# Patient Record
Sex: FEMALE | Race: WHITE | NOT HISPANIC OR LATINO
[De-identification: names, ages, dates, MRNs, and addresses within clinical notes are randomized per-mention and may not be internally consistent; named-entity substitution may affect disease eponyms.]

---

## 2019-09-25 PROBLEM — Z00.00 ENCOUNTER FOR PREVENTIVE HEALTH EXAMINATION: Status: ACTIVE | Noted: 2019-09-25

## 2019-10-02 ENCOUNTER — APPOINTMENT (OUTPATIENT)
Dept: COLORECTAL SURGERY | Facility: CLINIC | Age: 29
End: 2019-10-02
Payer: COMMERCIAL

## 2019-10-02 VITALS
TEMPERATURE: 98.2 F | BODY MASS INDEX: 21.67 KG/M2 | HEIGHT: 68 IN | DIASTOLIC BLOOD PRESSURE: 65 MMHG | WEIGHT: 143 LBS | HEART RATE: 71 BPM | SYSTOLIC BLOOD PRESSURE: 100 MMHG

## 2019-10-02 DIAGNOSIS — Z80.1 FAMILY HISTORY OF MALIGNANT NEOPLASM OF TRACHEA, BRONCHUS AND LUNG: ICD-10-CM

## 2019-10-02 DIAGNOSIS — Z80.42 FAMILY HISTORY OF MALIGNANT NEOPLASM OF PROSTATE: ICD-10-CM

## 2019-10-02 DIAGNOSIS — G89.18 OTHER ACUTE POSTPROCEDURAL PAIN: ICD-10-CM

## 2019-10-02 DIAGNOSIS — Z72.89 OTHER PROBLEMS RELATED TO LIFESTYLE: ICD-10-CM

## 2019-10-02 PROCEDURE — 99202 OFFICE O/P NEW SF 15 MIN: CPT | Mod: 25

## 2019-10-02 PROCEDURE — 46221 LIGATION OF HEMORRHOID(S): CPT

## 2019-10-02 RX ORDER — MIRABEGRON 50 MG/1
TABLET, FILM COATED, EXTENDED RELEASE ORAL
Refills: 0 | Status: ACTIVE | COMMUNITY

## 2019-10-02 RX ORDER — TRAMADOL HYDROCHLORIDE 50 MG/1
50 TABLET, COATED ORAL
Qty: 20 | Refills: 0 | Status: ACTIVE | COMMUNITY
Start: 2019-10-02 | End: 1900-01-01

## 2019-10-03 NOTE — ASSESSMENT
[FreeTextEntry1] : Exam findings and diagnosis were discussed at length with patient.\par Likely external hemorrhoidal tag\par Discussed management options - treatment of corresponding internal hemorrhoid with rubber band and reevaluation of external component vs surgical excision (single quadrant hemorrhoidectomy)\par Risks, benefits, alternatives discussed. Rubber banding performed today\par If symptoms persist or worsen, f/u for reevaluation and further management\par All questions were answered, patient expressed understanding, and is agreeable to this plan.

## 2019-10-03 NOTE — HISTORY OF PRESENT ILLNESS
[FreeTextEntry1] : 28 yo F presents for evaluation of hemorrhoids, referrd by MICHELE Sanchez\par \par Reports hemorrhoid since Feb/March in 2019, secondary to diarrhea from e. coli food poisoning.\par Had been eating bagels afterwards while ill and noted some straining and some BRBPR w/ some BMs\par Seen by Dr. Sanchez who prescribed topical cream w/ improvement in bleeding.\par Continues to have itching and irritation and prolapsing tissue that requires manual reduction w/ every BM\par Denies burning\par Tried Preparation H in the past. Does not have a bath tub\par \par BH: once daily, denies straining at present\par Reports adequate intake of dietary fiber and water\par No use of stool softeners or fiber supplements\par \par Never had colonoscopy\par Denies [facility] colorectal CA\par No ASA in the last 7 days. Took 600 mg ibuprofen this morning for pelvic spasms, completed antibiotics yesterday for ureaplasma infection\par

## 2019-10-03 NOTE — PROCEDURE
[FreeTextEntry1] : Rubber Band Ligation\par \par Pre-procedure Diagnosis: Symptomatic Hemorrhoids\par Post-procedure Diagnosis: Symptomatic Hemorrhoids\par Procedure: Anoscopy with Rubber Band Ligation\par Anesthesia: none\par Estimated blood loss: minimal\par Specimen: none\par Drain: none\par Complications: none\par \par Indications: Patient presents with history of symptoms related to internal hemorrhoids. On physical exam, l hemorrhoids were detected. Risks/benefits/alternative were discussed and patient agreed to proceed with office based procedure.\par \par Procedure Details: Consent obtained. Patient was placed in a modified prone jan-knife position on the examination table. Digital rectal exam was performed with an index finger with surgical jelly lubricant. An anoscope was inserted into the anal canal, and a prominent hemorrhoidal bundle was identified in the left anterior position. Using the hemorrhoid ligation device, a rubber band was placed around this hemorrhoid. No active bleeding was noted. The anoscope was removed. The patient tolerated the procedure well.\par \par Post-procedure instructions were reviewed with the patient, including recommendation to notify office immediately for any symptoms of severe pain, bleeding, inability to urinate, fever, or any other concern. All questions answered.\par

## 2019-10-03 NOTE — CONSULT LETTER
[Dear  ___] : Dear  [unfilled], [Consult Letter:] : I had the pleasure of evaluating your patient, [unfilled]. [( Thank you for referring [unfilled] for consultation for _____ )] : Thank you for referring [unfilled] for consultation for [unfilled] [Please see my note below.] : Please see my note below. [Consult Closing:] : Thank you very much for allowing me to participate in the care of this patient.  If you have any questions, please do not hesitate to contact me. [Sincerely,] : Sincerely, [FreeTextEntry2] : MACARIO ORTIZ\par [address] [place] [address] [address] \[place] [address]\par  [FreeTextEntry3] : ELIZA PEARL MD

## 2019-10-03 NOTE — PHYSICAL EXAM
[FreeTextEntry1] :  Medical assistant present for duration of physical examination\par \par Gen NAD, AOx3\par \par Anorectal Exam:\par Inspection no erythema, induration or fluctuance, no skin excoriation, no fissure, left anterior quadrant external hemorrhoidal tag\par JOE nontender, no masses palpated, no blood on gloved finger\par Anoscopy no fissure, left anterior quadrant internal hemorrhoidal bundle\par \par

## 2019-12-17 ENCOUNTER — APPOINTMENT (OUTPATIENT)
Dept: COLORECTAL SURGERY | Facility: CLINIC | Age: 29
End: 2019-12-17
Payer: COMMERCIAL

## 2019-12-17 VITALS
SYSTOLIC BLOOD PRESSURE: 105 MMHG | DIASTOLIC BLOOD PRESSURE: 65 MMHG | BODY MASS INDEX: 21.22 KG/M2 | WEIGHT: 140 LBS | TEMPERATURE: 98.5 F | HEART RATE: 76 BPM | HEIGHT: 68 IN

## 2019-12-17 PROCEDURE — 46600 DIAGNOSTIC ANOSCOPY SPX: CPT

## 2019-12-17 PROCEDURE — 99212 OFFICE O/P EST SF 10 MIN: CPT | Mod: 25

## 2019-12-18 NOTE — ASSESSMENT
[FreeTextEntry1] : Exam findings and diagnosis were discussed at length with patient.\par No significantly inflamed internal hemorrhoids\par Discussed if patient would like external tag addressed, surgical excision can be considered. Risks and benefits discussed, agree on conservative measurement this time\par Continue calmol-4 suppository. If symptoms worsen, f/u for reevaluation\par All questions were answered, patient expressed understanding, and is agreeable to this plan.\par

## 2019-12-18 NOTE — HISTORY OF PRESENT ILLNESS
[FreeTextEntry1] : 30 yo F presents for f/u hemorrhoids s/p RBL to LAQ on 10/2\par \par Pt reports symptoms improved, however still feels protruding all the time. Admits she can "tuck" it inside\par + itching after every BM, sometimes worse at night. Improved w/ Prep H\par hx of BRBPR last week, noted in toilet bowl\par \par BH: once daily, admits to some loose stool past few days due to travel to [place] and eating more fried foods.\par Reports adequate dietary fiber and water intake\par Denies laxative, stool softener or fiber supplements

## 2019-12-18 NOTE — PHYSICAL EXAM
[FreeTextEntry1] :  Medical assistant present for duration of physical examination\par \par Gen NAD, AOx3\par \par Anorectal Exam:\par Inspection no fissure, left anterior quadrant small nonedematous external hemorrhoidal tag\par JOE nontender, no masses palpated, no blood on gloved finger\par Anoscopy no fissure, mild non inflamed internal hemorhroidal tissues, well healed scar from prior banding\par \par

## 2019-12-18 NOTE — CONSULT LETTER
[Dear  ___] : Dear  [unfilled], [Consult Letter:] : I had the pleasure of evaluating your patient, [unfilled]. [( Thank you for referring [unfilled] for consultation for _____ )] : Thank you for referring [unfilled] for consultation for [unfilled] [Please see my note below.] : Please see my note below. [Consult Closing:] : Thank you very much for allowing me to participate in the care of this patient.  If you have any questions, please do not hesitate to contact me. [Sincerely,] : Sincerely, [FreeTextEntry2] : MACARIO ORTIZ\par [address]  \[place] [address]\par \par \par  [FreeTextEntry3] : ELIZA PEARL MD

## 2020-06-30 ENCOUNTER — APPOINTMENT (OUTPATIENT)
Dept: DERMATOLOGY | Facility: CLINIC | Age: 30
End: 2020-06-30

## 2020-12-28 ENCOUNTER — APPOINTMENT (OUTPATIENT)
Dept: COLORECTAL SURGERY | Facility: CLINIC | Age: 30
End: 2020-12-28
Payer: COMMERCIAL

## 2020-12-28 VITALS
BODY MASS INDEX: 21.07 KG/M2 | SYSTOLIC BLOOD PRESSURE: 102 MMHG | TEMPERATURE: 97.5 F | DIASTOLIC BLOOD PRESSURE: 66 MMHG | HEIGHT: 68 IN | HEART RATE: 57 BPM | WEIGHT: 139 LBS

## 2020-12-28 DIAGNOSIS — K64.9 UNSPECIFIED HEMORRHOIDS: ICD-10-CM

## 2020-12-28 PROCEDURE — 99212 OFFICE O/P EST SF 10 MIN: CPT | Mod: 25

## 2020-12-28 PROCEDURE — 46600 DIAGNOSTIC ANOSCOPY SPX: CPT

## 2020-12-28 PROCEDURE — 99072 ADDL SUPL MATRL&STAF TM PHE: CPT

## 2020-12-28 NOTE — HISTORY OF PRESENT ILLNESS
[FreeTextEntry1] : 29 yo F presents for f/u hemorrhoids \par Last seen in the office 12/17/19. Mild noninflamed internal hemorrhoidal tissues and well healed scar from prior banding noted. Left anterior quadrant small nonedematous external hemorrhoidal skin tag also observed. Patient was advised to continue with Calmol-4 suppositories \par \par Reports continued irritation possibly from skin tag since last visit. Denies use of calmol-4. \par Reports occasional hemorrhoidal swelling that resolves on own or sometimes applies topical cortisone w/ improvement. \par Occasional itching\par Denies pain or BPR\par BH: daily, denies complaints. Drinks coffee once every couple of weeks\par \par Reports adequate dietary fiber and water intake\par Denies stool softeners, fiber supplements or laxatives \par Never had a colonoscopy\par Denies ASA use. s/p 2 steroid injections in SI joints and took Meloxicam twice this last week

## 2020-12-28 NOTE — ASSESSMENT
[FreeTextEntry1] : Exam findings and diagnosis were discussed at length with patient.\par Patient reassured\par Recommend calmol-4 suppository prn symptoms\par Discussed if patient would like external tag addressed, surgical excision can be considered. \par Patient declines further intervention, requests continued supportive care at this time.\par F/u if symptoms worsen or new symptoms arise\par All questions were answered, patient expressed understanding, and is agreeable to this plan.\par

## 2020-12-28 NOTE — PHYSICAL EXAM
[FreeTextEntry1] : Medical assistant present for duration of physical examination\par \par Gen no acute distress, alert and oriented\par Psych calm, pleasant demeanor, responding appropriately to questions\par Nonlabored breathing\par Ambulating without assistance\par Skin normal color and pigment, no visible lesions or rashes\par \par Anorectal Exam:\par Inspection no erythema, induration or fluctuance, no skin excoriation, no fissure, minimal small external skin tag left anterior non-inflamed\par JOE nontender, no masses palpated, no blood on gloved finger\par \par Procedure: Anoscopy\par \par Pre procedure Diagnosis: hemorrhoids\par Post procedure Diagnosis: hemorrhoids\par Anesthesia: none\par Estimated blood loss: none\par Specimen: none\par Complications: none\par \par Consent obtained. Anoscopy was performed by passing a lighted anoscope with lubricant jelly into the anal canal and the entire anal mucosal surface was inspected. Findings included no fissure, noninflamed internal hemorrhoid tissue, no visible masses or lesions\par \par Patient tolerated examination and procedure well.\par \par \par

## 2021-10-25 ENCOUNTER — NON-APPOINTMENT (OUTPATIENT)
Age: 31
End: 2021-10-25

## 2021-10-25 ENCOUNTER — APPOINTMENT (OUTPATIENT)
Dept: DERMATOLOGY | Facility: CLINIC | Age: 31
End: 2021-10-25
Payer: COMMERCIAL

## 2021-10-25 DIAGNOSIS — L64.9 ANDROGENIC ALOPECIA, UNSPECIFIED: ICD-10-CM

## 2021-10-25 DIAGNOSIS — Z12.83 ENCOUNTER FOR SCREENING FOR MALIGNANT NEOPLASM OF SKIN: ICD-10-CM

## 2021-10-25 DIAGNOSIS — L57.8 OTHER SKIN CHANGES DUE TO CHRONIC EXPOSURE TO NONIONIZING RADIATION: ICD-10-CM

## 2021-10-25 DIAGNOSIS — D22.9 MELANOCYTIC NEVI, UNSPECIFIED: ICD-10-CM

## 2021-10-25 PROCEDURE — 99204 OFFICE O/P NEW MOD 45 MIN: CPT

## 2021-10-25 RX ORDER — TRETINOIN 0.25 MG/G
0.03 CREAM TOPICAL
Qty: 1 | Refills: 2 | Status: ACTIVE | COMMUNITY
Start: 2021-10-25 | End: 1900-01-01

## 2021-10-25 NOTE — HISTORY OF PRESENT ILLNESS
[de-identified] : new patient \par mom has had stuff frozen off\par no hx skin cancer or atn\par no new or changing but has been 2 years \par wears spf daily face, neck, hands \par worried hairline moving back, still has a lot of hair \par using retinol \par  [FreeTextEntry1] : moles

## 2021-10-25 NOTE — PHYSICAL EXAM
[Alert] : alert [Oriented x 3] : ~L oriented x 3 [Well Nourished] : well nourished [Conjunctiva Non-injected] : conjunctiva non-injected [No Visual Lymphadenopathy] : no visual  lymphadenopathy [No Clubbing] : no clubbing [No Edema] : no edema [No Bromhidrosis] : no bromhidrosis [No Chromhidrosis] : no chromhidrosis [Full Body Skin Exam Performed] : performed [FreeTextEntry3] : white skin with scattered small brown macules and papules\par some FT miniaturization

## 2022-09-19 ENCOUNTER — APPOINTMENT (OUTPATIENT)
Dept: DERMATOLOGY | Facility: CLINIC | Age: 32
End: 2022-09-19